# Patient Record
Sex: MALE | Race: WHITE
[De-identification: names, ages, dates, MRNs, and addresses within clinical notes are randomized per-mention and may not be internally consistent; named-entity substitution may affect disease eponyms.]

---

## 2017-02-03 ENCOUNTER — HOSPITAL ENCOUNTER (OUTPATIENT)
Dept: HOSPITAL 45 - C.RAD | Age: 82
Discharge: HOME | End: 2017-02-03
Attending: INTERNAL MEDICINE
Payer: COMMERCIAL

## 2017-02-03 DIAGNOSIS — J44.9: Primary | ICD-10-CM

## 2017-02-03 DIAGNOSIS — R05: ICD-10-CM

## 2017-02-03 NOTE — DIAGNOSTIC IMAGING REPORT
TWO VIEW CHEST



CLINICAL HISTORY: Cough. COPD.



FINDINGS: PA and lateral chest radiographs are compared to study dated 3/3/2016

and correlated with chest CT dated 11/12/2013. The heart is mildly enlarged and

there is atherosclerotic calcification of the thoracic aorta. The pulmonary

vascular structures noncongested. Mild emphysematous change is noted and there

is chronic interstitial thickening. Minimal patchy airspace opacities are

present at both lung bases. There is no pleural effusion or pneumothorax. The

skeletal structures are osteopenic. Bilateral shoulder arthroplasties are in

place. Cholecystectomy clips are noted.



IMPRESSION:



1. Mild cardiac enlargement and emphysema.



2. Patchy airspace opacities are present at the lung bases. This could represent

atelectasis and/or a mild infectious/inflammatory pneumonitis. Clinical

correlation will be required







Electronically signed by:  Marcell Juarez M.D.

2/3/2017 12:04 PM



Dictated Date/Time:  2/3/2017 11:59 AM

## 2017-03-12 ENCOUNTER — HOSPITAL ENCOUNTER (EMERGENCY)
Dept: HOSPITAL 45 - C.EDB | Age: 82
Discharge: HOME | End: 2017-03-12
Payer: COMMERCIAL

## 2017-03-12 VITALS — HEART RATE: 95 BPM | SYSTOLIC BLOOD PRESSURE: 133 MMHG | OXYGEN SATURATION: 97 % | DIASTOLIC BLOOD PRESSURE: 93 MMHG

## 2017-03-12 VITALS
HEIGHT: 65.98 IN | HEIGHT: 65.98 IN | BODY MASS INDEX: 23.07 KG/M2 | BODY MASS INDEX: 23.07 KG/M2 | WEIGHT: 143.52 LBS | WEIGHT: 143.52 LBS

## 2017-03-12 VITALS — TEMPERATURE: 97.52 F

## 2017-03-12 DIAGNOSIS — B02.23: Primary | ICD-10-CM

## 2017-03-12 DIAGNOSIS — Z79.899: ICD-10-CM

## 2017-03-12 DIAGNOSIS — J44.9: ICD-10-CM

## 2017-03-12 DIAGNOSIS — B02.9: ICD-10-CM

## 2017-03-12 DIAGNOSIS — Z79.82: ICD-10-CM

## 2017-03-12 NOTE — EMERGENCY ROOM VISIT NOTE
History


Report prepared by Nikkiibelvi:  Michelle Holly


Under the Supervision of:  Dr. Dom Cash D.O.


First contact with patient:  11:12


Chief Complaint:  ABDOMINAL PAIN


Stated Complaint:  PAIN IN UPPER TORSO


Nursing Triage Summary:  


Pt presents with LUQ abd pain x 1 week. States "always constipated". Denies 


cough, sob, n/v/d.





History of Present Illness


The patient is a 84 year old male who presents to the Emergency Room with 

complaints of progressively worsening right sided rib pain that began a week 

ago. Currently, he states that the pain is stabbing. It is worse at night to 

the point that he has difficulty falling asleep. Nothing seems to make the pain 

better or worse. The pain is present for hours at a time and woke him up from 

his sleep this morning. Initially he thought that the pain could be related to 

acid reflux but he became more concerned as his symptoms persisted. Denies cough

, fevers, shortness of breath, or other complaints. He has not noticed any rash.





   Source of History:  patient


   Onset:  a week ago


   Position:  other (left ribs)


   Quality:  stabbing


   Timing:  worsening


   Associated Symptoms:  No SOB, No cough, No fevers





Review of Systems


See HPI for pertinent positives & negatives. A total of 10 systems reviewed and 

were otherwise negative.





Past Medical & Surgical


Medical Problems:


(1) COPD (chronic obstructive pulmonary disease)


(2) Renal lesion








Family History


Noncontributory secondary to age.





Social History


Smoking Status:  Former Smoker


Marital Status:  


Housing Status:  lives with significant other


Occupation Status:  retired





Current/Historical Medications


Scheduled


Aspirin Enteric Coated (Ecotrin Or Generic *), 81 MG PO 2XWK


Bimatoprost (Lumigan), 1 DROPS OPB HS


Budesonide/Formoterol Fumarate (Symbicort 80-4.5 Mcg/Act), 2 PUFFS INH BID


Metoprolol Tartrate (Lopressor), 25 MG PO BID


Misc Natural Products (Lutein 20), 1 CAP PO QPM


Pantoprazole Sodium (Protonix), 40 MG PO QPM


Simvastatin (Zocor), 40 MG PO QPM


Tiotropium Bromide (Spiriva Handihaler), 1 CAP INH QAM





Allergies


Coded Allergies:  


     No Known Allergies (Verified , 12/4/15)





Physical Exam


Vital Signs











  Date Time  Temp Pulse Resp B/P Pulse Ox O2 Delivery O2 Flow Rate FiO2


 


3/12/17 11:08 36.4 111 18 134/88 99 Room Air  











Physical Exam


CONSTITUTIONAL/VITAL SIGNS: Reviewed / noted above.


GENERAL: Non-toxic in appearance. 


INTEGUMENTARY: Warm, dry, and Pink. He has a rash characteristic of shingles 

following the left T4 dermatome. 


HEAD: Normocephalic.


EYES: without scleral icterus or trauma.


ENT/OROPHARYNX: clear and moist.


LYMPHADENOPATHY/NECK: Is supple without lymphadenopathy or meningismus.


RESPIRATORY: Lungs clear and equal.


CARDIOVASCULAR: Regular rate and rhythm.


GI/ABDOMEN: Soft and nontender. No organomegaly or pulsatile mass. No rebound 

or guarding. Normal bowel sounds.


EXTREMITIES: Warm and well perfused.


BACK: No CVA tenderness.


NEUROLOGICAL: Intact without focal deficits. 


PSYCHIATRIC: normal affect.


MUSCULOSKELETAL: Normally developed with good muscle tone.





Medical Decision & Procedures


ED Course


1114: Previous medical records were reviewed. The patient was evaluated in room 

A2. A complete history and physical examination was performed.





1130: I discussed the results and findings with the patient. He verbalized 

agreement of the treatment plan. The patient was discharged home. Ordered 

Valtrex 1000 mg PO.





Medical Decision


the differential was considered includes acute myocardial infarction, acute 

coronary syndrome, myocarditis, pericarditis, pericardial effusions /tamponad, 

esophageal perforation, thoracic aortic dissection, pulmonary embolism, 

pneumonia, pneumothorax, pancreatitis, shingles, acute cholecystitis, 

perforated abdominal viscus.





This is an 84-year-old male who presents to the ED with a chief complaint of 

left sided upper abdominal pain.  The patient states it is been ongoing for the 

past week or so.  He states that the pain seems to be random and comes and goes 

and lasts for hours.  Denies any shortness of breath or exertional symptoms.  

No recent illness.  He states that he sometimes feels a stabbing pain in the 

left side.  His exam was normal with exception of a shingles rash following the 

left T4 dermatome.  The patient's symptoms are likely related to this.  He was 

started on Valtrex.  He was felt to be stable for discharge.





Impression





 Primary Impression:  


 Shingles (herpes zoster) polyneuropathy


 Additional Impression:  


 Shingles rash





Scribe Attestation


The scribe's documentation has been prepared under my direction and personally 

reviewed by me in its entirety. I confirm that the note above accurately 

reflects all work, treatment, procedures, and medical decision making performed 

by me.





Departure Information


Dispostion


Home / Self-Care





Prescriptions





Valacyclovir Hcl (VALTREX) 1 Gm Tab


1 TAB PO TID for 7 Days, #21 TAB


   Prov: Dom Cash D.O.         3/12/17





Referrals


Bertin Cid M.D. (PCP)





Patient Instructions


DILIP Mooney, My [facility]





Additional Instructions





Valtrax as prescribed.





Take over-the-counter pain medication for discomfort.





Follow-up with your doctor for recheck in a week.





Problem Qualifiers

## 2017-03-15 ENCOUNTER — HOSPITAL ENCOUNTER (EMERGENCY)
Dept: HOSPITAL 45 - C.EDC | Age: 82
Discharge: ANOTHER HOSPITAL | End: 2017-03-15
Payer: COMMERCIAL

## 2017-03-15 VITALS
TEMPERATURE: 98.06 F | SYSTOLIC BLOOD PRESSURE: 146 MMHG | HEART RATE: 95 BPM | DIASTOLIC BLOOD PRESSURE: 77 MMHG | OXYGEN SATURATION: 96 %

## 2017-03-15 VITALS — OXYGEN SATURATION: 98 %

## 2017-03-15 VITALS
WEIGHT: 149.25 LBS | BODY MASS INDEX: 23.99 KG/M2 | HEIGHT: 65.98 IN | BODY MASS INDEX: 23.99 KG/M2 | WEIGHT: 149.25 LBS | HEIGHT: 65.98 IN

## 2017-03-15 DIAGNOSIS — Z91.81: ICD-10-CM

## 2017-03-15 DIAGNOSIS — J44.9: ICD-10-CM

## 2017-03-15 DIAGNOSIS — R55: ICD-10-CM

## 2017-03-15 DIAGNOSIS — Z87.891: ICD-10-CM

## 2017-03-15 DIAGNOSIS — S06.5X9A: Primary | ICD-10-CM

## 2017-03-15 DIAGNOSIS — R29.700: ICD-10-CM
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
## 2017-03-15 DIAGNOSIS — M54.5: ICD-10-CM
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
## 2017-03-15 DIAGNOSIS — Z79.82: ICD-10-CM

## 2017-03-15 DIAGNOSIS — Z79.899: ICD-10-CM

## 2017-03-15 LAB
ALBUMIN/GLOB SERPL: 1.2 {RATIO} (ref 0.9–2)
ALP SERPL-CCNC: 70 U/L (ref 45–117)
ALT SERPL-CCNC: 28 U/L (ref 12–78)
ANION GAP SERPL CALC-SCNC: 7 MMOL/L (ref 3–11)
APPEARANCE UR: CLEAR
AST SERPL-CCNC: 23 U/L (ref 15–37)
BASOPHILS # BLD: 0.04 K/UL (ref 0–0.2)
BASOPHILS NFR BLD: 1.2 %
BILIRUB UR-MCNC: (no result) MG/DL
BUN SERPL-MCNC: 15 MG/DL (ref 7–18)
BUN/CREAT SERPL: 13.8 (ref 10–20)
CALCIUM SERPL-MCNC: 8.4 MG/DL (ref 8.5–10.1)
CHLORIDE SERPL-SCNC: 108 MMOL/L (ref 98–107)
CKMB/CK RATIO: 2.2 (ref 0–3)
CO2 SERPL-SCNC: 27 MMOL/L (ref 21–32)
COLOR UR: YELLOW
COMPLETE: YES
CREAT CL PREDICTED SERPL C-G-VRATE: 45.1 ML/MIN
CREAT SERPL-MCNC: 1.1 MG/DL (ref 0.6–1.4)
EOSINOPHIL NFR BLD AUTO: 184 K/UL (ref 130–400)
GLOBULIN SER-MCNC: 3.1 GM/DL (ref 2.5–4)
GLUCOSE SERPL-MCNC: 97 MG/DL (ref 70–99)
HCT VFR BLD CALC: 42.6 % (ref 42–52)
IG%: 0.3 %
IMM GRANULOCYTES NFR BLD AUTO: 24.6 %
INR PPP: 1.1 (ref 0.9–1.1)
LYMPHOCYTES # BLD: 0.82 K/UL (ref 1.2–3.4)
MANUAL MICROSCOPIC REQUIRED?: NO
MCH RBC QN AUTO: 29.6 PG (ref 25–34)
MCHC RBC AUTO-ENTMCNC: 34.3 G/DL (ref 32–36)
MCV RBC AUTO: 86.4 FL (ref 80–100)
MONOCYTES NFR BLD: 14.1 %
NEUTROPHILS # BLD AUTO: 2.1 %
NEUTROPHILS NFR BLD AUTO: 57.7 %
NITRITE UR QL STRIP: (no result)
PARTIAL THROMBOPLASTIN RATIO: 1
PFT COL EPI: 110 SECONDS (ref 80–184)
PH UR STRIP: 6 [PH] (ref 4.5–7.5)
PMV BLD AUTO: 9.7 FL (ref 7.4–10.4)
POTASSIUM SERPL-SCNC: 3.7 MMOL/L (ref 3.5–5.1)
PROTHROMBIN TIME: 11.4 SECONDS (ref 9–12)
RBC # BLD AUTO: 4.93 M/UL (ref 4.7–6.1)
REVIEW REQ?: NO
SODIUM SERPL-SCNC: 142 MMOL/L (ref 136–145)
SP GR UR STRIP: 1.01 (ref 1–1.03)
TSH SERPL-ACNC: 1.31 UIU/ML (ref 0.3–4.5)
URINE EPITHELIAL CELL AUTO: (no result) /LPF (ref 0–5)
UROBILINOGEN UR-MCNC: (no result) MG/DL
WBC # BLD AUTO: 3.33 K/UL (ref 4.8–10.8)
ZZUR CULT IF INDIC CLEAN CATCH: NO

## 2017-03-15 NOTE — DIAGNOSTIC IMAGING REPORT
SINGLE VIEW CHEST



CLINICAL HISTORY: Atypical chest pain.



FINDINGS: An AP, portable, upright chest radiograph is compared to study dated

2/3/2017 and correlated with chest CT dated 11/12/2013. The examination is

degraded by portable technique and patient rotation.  The heart is mildly

enlarged and there is atherosclerotic calcification of the thoracic aorta. The

pulmonary vasculature is noncongested. Mild emphysematous change is noted and

there is chronic interstitial thickening. There is minimal bibasilar

atelectasis. No airspace consolidation, large pleural effusion, or pneumothorax

is seen. The skeletal structures are osteopenic. Bilateral shoulder

arthroplasties are in place. 



IMPRESSION:  Mild cardiac enlargement and emphysema with no acute

cardiopulmonary abnormality.







Electronically signed by:  Marcell Juarez M.D.

3/15/2017 4:05 PM



Dictated Date/Time:  3/15/2017 4:03 PM

## 2017-03-15 NOTE — DIAGNOSTIC IMAGING REPORT
CT OF THE HEAD WITHOUT CONTRAST



CLINICAL HISTORY: Fall with head injury.    



COMPARISON STUDY:  MRI of the brain March 31, 2010. 



CT DOSE: 537.48 mGy.cm



TECHNIQUE: Helical axial images of the head were obtained without IV contrast.

Automated exposure control was utilized for the study.



FINDINGS: There is a small hyperdense left subdural collection consistent with a

hematoma that measures 7 mm in thickness. There is minimal mass effect with

sulcal effacement. There is no midline shift. Ventricular system is normal.

Basilar cisterns are patent. No additional extra-axial fluid collections are

present. There is a left posterior scalp contusion. There is no calvarial

fracture.



IMPRESSION:  



1. Small acute left subdural hematoma with minimal mass effect. A follow-up head

CT in 12 to 24 hours is recommended. Findings discussed with Dr. Boswell at

time of dictation.



2. Left posterior scalp contusion. No calvarial fracture.







Electronically signed by:  Nick Thacker M.D.

3/15/2017 5:43 PM



Dictated Date/Time:  3/15/2017 5:38 PM

## 2017-03-16 NOTE — EMERGENCY ROOM VISIT NOTE
History


Report prepared by Andrea:  Adrian Powell


Under the Supervision of:  Dr. Leandro Boswell M.D.


First contact with patient:  15:56


Chief Complaint:  SYNCOPE


Stated Complaint:  SYNCOPE, SHINGLES





History of Present Illness


The patient is a 84 year old male who presents to the Emergency Room with 

complaints of a sudden syncopal episode occurring prior to arrival. The patient 

states that he has been feeling fine for the past few days, and he ate lunch 

around 1400 today. The patient then states that he went to the other room and 

he lost consciousness and fell to the ground, and his wife found him on the 

ground. The wife states that the patient was tracking her with his eyes after 

the incident, however he was not speaking. EMS showed up 10-15 minutes later. 

The patient states that he remembers eating lunch, but he does not remember 

much after that. He states that he felt fine prior to the incident, and he was 

not feeling woozy or anything. The patient denies any history of prior falls. 

The patient states that he fell on the ice this morning, however he did not hit 

his head, and during this incident he hit his head. He states that he is on 

aspirin and recently he was put on Valtrex for his shingles. He additionally 

complains of back pain starting earlier today. Pt denies headache, fevers, 

chills, diaphoresis, visual changes, neck pain, chest pain, breathing 

difficulties, nausea, vomiting, abdominal pain, melena, hematochezia, urinary 

symptoms, numbness, weakness, lymphadenopathy, rash, or other complaints.





   Source of History:  patient, spouse/significant other


   Onset:  prior to arrival


   Position:  other (global)


   Quality:  other (syncopal episode)


   Timing:  other (sudden)


   Associated Symptoms:  + LOC, + back pain





Review of Systems


See HPI for pertinent positives and negatives.  A total of ten systems were 

reviewed and were otherwise negative.





Past Medical & Surgical


Medical Problems:


(1) COPD (chronic obstructive pulmonary disease)


(2) Renal lesion








Family History





Patient reports no known family medical history.





Social History


Smoking Status:  Former Smoker


Marital Status:  


Housing Status:  lives with significant other


Occupation Status:  retired





Current/Historical Medications


Scheduled


Aspirin Enteric Coated (Ecotrin Or Generic), 81 MG PO 3XWK


Bimatoprost (Lumigan), 1 DROPS OPB HS


Metoprolol Tartrate (Lopressor), 25 MG PO BID


Misc Natural Products (Lutein 20), 1 CAP PO QPM


Multiple Vitamins W/ Minerals (Preservision Areds), 1 CAP PO DAILY


Pantoprazole Sodium (Protonix), 40 MG PO QPM


Simvastatin (Zocor), 40 MG PO QPM


Valacyclovir Hcl (Valtrex), 1 TAB PO TID





Scheduled PRN


Ipratropium Bromide (Nasal) (Ipratropium [place]), 1 SPRAY PIERCE DAILY PRN for 

Nasal Congestion





Allergies


Coded Allergies:  


     No Known Allergies (Verified , 3/15/17)





Physical Exam


Vital Signs











  Date Time  Temp Pulse Resp B/P Pulse Ox O2 Delivery O2 Flow Rate FiO2


 


3/15/17 22:30 36.7 95 16 146/77 96   


 


3/15/17 22:12 36.7 95 16 146/77 96 Room Air  


 


3/15/17 19:49  86 18 132/97 96 Room Air  


 


3/15/17 18:36  99 12 128/85 95 Room Air  


 


3/15/17 17:23  94 12 145/79 97 Room Air  


 


3/15/17 16:35  93      


 


3/15/17 15:28 36.6 89 18 145/80 99 Room Air  


 


3/15/17 15:28     98 Room Air  











Physical Exam


GENERAL: Awake, alert, tired appearing, no distress


HEAD: Right posterior parietal hematoma.. Normocephalic. No mathias sign. No 

raccoon eyes.


EYES: Normal conjunctiva. PERRL.


EARS: External ears normal.


NOSE: Atraumatic


OROPHARYNX: Lips, tongue, and mucosa unremarkable. No erythema or exudate.


NECK: Supple with full range of motion. No tracheal deviation or JVD. No 

posterior midline tenderness. No step offs noted.


RESPIRATORY: CTA bilaterally


CARDIAC: Regular rate, normal rhythm. 


ABDOMEN: Inspection reveals no abnormalities. Soft, non distended. No 

tenderness to palpation. No hernias.


BACK: Left lower lumbar tenderness. No midline step offs. Unremarkable.


PELVIS: Stable to rock.


SKIN: T4 dermatome vesicular rash consistent with shingles


LYMPH: No adenopathy.


MUSCULOSKELETAL: Upper and lower extremities are atraumatic. 


NEURO: GCS 15. Normal sensorium. No sensory or motor deficits noted.





Medical Decision & Procedures


ER Provider


Diagnostic Interpretation:


X ray results as stated below per my interpretation and radiologist 

interpretation. Other radiology results as stated below per my review and 

radiologist interpretation:





SINGLE VIEW CHEST





CLINICAL HISTORY: Atypical chest pain.





FINDINGS: An AP, portable, upright chest radiograph is compared to study dated


2/3/2017 and correlated with chest CT dated 11/12/2013. The examination is


degraded by portable technique and patient rotation.  The heart is mildly


enlarged and there is atherosclerotic calcification of the thoracic aorta. The


pulmonary vasculature is noncongested. Mild emphysematous change is noted and


there is chronic interstitial thickening. There is minimal bibasilar


atelectasis. No airspace consolidation, large pleural effusion, or pneumothorax


is seen. The skeletal structures are osteopenic. Bilateral shoulder


arthroplasties are in place. 





IMPRESSION:  Mild cardiac enlargement and emphysema with no acute


cardiopulmonary abnormality.





Electronically signed by:  Marcell Juarez M.D.


3/15/2017 4:05 PM





Dictated Date/Time:  3/15/2017 4:03 PM





CT OF THE HEAD WITHOUT CONTRAST





CLINICAL HISTORY: Fall with head injury.    





COMPARISON STUDY:  MRI of the brain March 31, 2010. 





CT DOSE: 537.48 mGy.cm





TECHNIQUE: Helical axial images of the head were obtained without IV contrast.


Automated exposure control was utilized for the study.





FINDINGS: There is a small hyperdense left subdural collection consistent with a


hematoma that measures 7 mm in thickness. There is minimal mass effect with


sulcal effacement. There is no midline shift. Ventricular system is normal.


Basilar cisterns are patent. No additional extra-axial fluid collections are


present. There is a left posterior scalp contusion. There is no calvarial


fracture.





IMPRESSION:  





1. Small acute left subdural hematoma with minimal mass effect. A follow-up head


CT in 12 to 24 hours is recommended. Findings discussed with Dr. Boswell at


time of dictation.





2. Left posterior scalp contusion. No calvarial fracture.





Electronically signed by:  Nick Thacker M.D.


3/15/2017 5:43 PM





Dictated Date/Time:  3/15/2017 5:38 PM





CT LUMBAR SPINE WITHOUT





CT DOSE: 635.54 mGy.cm





CLINICAL HISTORY: Left lower back pain following fall.    





TECHNIQUE: Axial images of the lumbar spine were obtained without IV contrast.


Sagittal and coronal reconstructions were viewed.





COMPARISON STUDY:  None.





FINDINGS: No acute lumbar spine fracture is identified. There is moderate to


severe multilevel degenerative disc disease with disc space narrowing,


osteophytosis and vacuum disc phenomenon. A few lucent lesions are unchanged


since prior CT of November 1, 2016. These are probably benign. Paravertebral


soft tissues are unremarkable by CT. Central canal and neural foramen are


suboptimally assessed by CT. The bladder is moderately to markedly distended and


partially imaged on this exam. Bilateral renal calculi are noted.





IMPRESSION:  





1. No acute lumbar spine fracture or subluxation.





2. Moderate to severe multilevel degenerative changes of the lumbar spine.





3. Moderate to marked distention of the bladder which is partially imaged on


this exam 





Electronically signed by:  Nick Thacker M.D.


3/15/2017 5:47 PM





Dictated Date/Time:  3/15/2017 5:44 PM





Laboratory Results


3/15/17 15:55








Red Blood Count 4.93, Mean Corpuscular Volume 86.4, Mean Corpuscular Hemoglobin 

29.6, Mean Corpuscular Hemoglobin Concent 34.3, Mean Platelet Volume 9.7, 

Neutrophils (%) (Auto) 57.7, Lymphocytes (%) (Auto) 24.6, Monocytes (%) (Auto) 

14.1, Eosinophils (%) (Auto) 2.1, Basophils (%) (Auto) 1.2, Neutrophils # (Auto

) 1.92, Lymphocytes # (Auto) 0.82, Monocytes # (Auto) 0.47, Eosinophils # (Auto

) 0.07, Basophils # (Auto) 0.04





3/15/17 15:55

















Test


  3/15/17


15:55 3/15/17


15:57 3/15/17


17:29 3/15/17


20:56


 


White Blood Count


  3.33 K/uL


(4.8-10.8) 


  


  


 


 


Red Blood Count


  4.93 M/uL


(4.7-6.1) 


  


  


 


 


Hemoglobin


  14.6 g/dL


(14.0-18.0) 


  


  


 


 


Hematocrit 42.6 % (42-52)    


 


Mean Corpuscular Volume


  86.4 fL


() 


  


  


 


 


Mean Corpuscular Hemoglobin


  29.6 pg


(25-34) 


  


  


 


 


Mean Corpuscular Hemoglobin


Concent 34.3 g/dl


(32-36) 


  


  


 


 


Platelet Count


  184 K/uL


(130-400) 


  


  


 


 


Mean Platelet Volume


  9.7 fL


(7.4-10.4) 


  


  


 


 


Neutrophils (%) (Auto) 57.7 %    


 


Lymphocytes (%) (Auto) 24.6 %    


 


Monocytes (%) (Auto) 14.1 %    


 


Eosinophils (%) (Auto) 2.1 %    


 


Basophils (%) (Auto) 1.2 %    


 


Neutrophils # (Auto)


  1.92 K/uL


(1.4-6.5) 


  


  


 


 


Lymphocytes # (Auto)


  0.82 K/uL


(1.2-3.4) 


  


  


 


 


Monocytes # (Auto)


  0.47 K/uL


(0.11-0.59) 


  


  


 


 


Eosinophils # (Auto)


  0.07 K/uL


(0-0.5) 


  


  


 


 


Basophils # (Auto)


  0.04 K/uL


(0-0.2) 


  


  


 


 


RDW Standard Deviation


  41.8 fL


(36.4-46.3) 


  


  


 


 


RDW Coefficient of Variation


  13.1 %


(11.5-14.5) 


  


  


 


 


Immature Granulocyte % (Auto) 0.3 %    


 


Immature Granulocyte # (Auto)


  0.01 K/uL


(0.00-0.02) 


  


  


 


 


Prothrombin Time


  11.4 SECONDS


(9.0-12.0) 


  


  


 


 


Prothromb Time International


Ratio 1.1 (0.9-1.1) 


  


  


  


 


 


Activated Partial


Thromboplast Time 26.8 SECONDS


(21.0-31.0) 


  


  


 


 


Partial Thromboplastin Ratio 1.0    


 


Anion Gap


  7.0 mmol/L


(3-11) 


  


  


 


 


Est Creatinine Clear Calc


Drug Dose 45.1 ml/min 


  


  


  


 


 


Estimated GFR (


American) 71.1 


  


  


  


 


 


Estimated GFR (Non-


American 61.3 


  


  


  


 


 


BUN/Creatinine Ratio 13.8 (10-20)    


 


Calcium Level


  8.4 mg/dl


(8.5-10.1) 


  


  


 


 


Total Bilirubin


  0.3 mg/dl


(0.2-1) 


  


  


 


 


Aspartate Amino Transf


(AST/SGOT) 23 U/L (15-37) 


  


  


  


 


 


Alanine Aminotransferase


(ALT/SGPT) 28 U/L (12-78) 


  


  


  


 


 


Alkaline Phosphatase


  70 U/L


() 


  


  


 


 


Total Creatine Kinase


  89 U/L


() 


  


  


 


 


Creatine Kinase MB


  2.0 ng/ml


(0.5-3.6) 


  


  


 


 


Creatine Kinase MB Ratio 2.2 (0-3.0)    


 


Total Protein


  6.7 gm/dl


(6.4-8.2) 


  


  


 


 


Albumin


  3.6 gm/dl


(3.4-5.0) 


  


  


 


 


Globulin


  3.1 gm/dl


(2.5-4.0) 


  


  


 


 


Albumin/Globulin Ratio 1.2 (0.9-2)    


 


Thyroid Stimulating Hormone


(TSH) 1.310 uIu/ml


(0.300-4.500) 


  


  


 


 


Bedside Troponin I


  


  0.000 ng/ml


(0-0.045) 


  


 


 


Urine Color   YELLOW  


 


Urine Appearance   CLEAR (CLEAR)  


 


Urine pH   6.0 (4.5-7.5)  


 


Urine Specific Gravity


  


  


  1.015


(1.000-1.030) 


 


 


Urine Protein   NEG (NEG)  


 


Urine Glucose (UA)   NEG (NEG)  


 


Urine Ketones   TRACE (NEG)  


 


Urine Occult Blood   NEG (NEG)  


 


Urine Nitrite   NEG (NEG)  


 


Urine Bilirubin   NEG (NEG)  


 


Urine Urobilinogen   NEG (NEG)  


 


Urine Leukocyte Esterase   NEG (NEG)  


 


Urine WBC (Auto)   0 /hpf (0-5)  


 


Urine RBC (Auto)   0-4 /hpf (0-4)  


 


Urine Hyaline Casts (Auto)   0 /lpf (0-5)  


 


Urine Epithelial Cells (Auto)   0-5 /lpf (0-5)  


 


Urine Bacteria (Auto)   NEG (NEG)  


 


Platelet Func


Collagen/Epinephrine 


  


  


  110 SECONDS


()








Laboratory results reviewed by me





ECG


Indication:  syncope


Rate (beats per minute):  88


Rhythm:  normal sinus


Findings:  nonspecific-ST abn, Q waves (Inferior, Anterior), no acute ischemic 

change, no ectopy





ED Course


1628: The patient was evaluated in room C6. A complete history and physical 

exam was performed.





1804: I discussed the patient's case with Dr. Scott. He is going to evaluate the 

patient for further treatment.





1830: I reevaluated the patient, and he was resting. The patient is waiting to 

be transferred.





2202: After the lack of transport, I discussed the patient's case with Dr. Sellers. He is going to evaluate the patient for further treatment





Medical Decision


Triage Nursing notes reviewed.


The patient's presentation and history were concerning for syncope and closed 

head injury.  





Etiologies such as vasovagal event, infection, hypoglycemia, electrolyte 

abnormalities, cardiac sources, intracerebral event, toxicologic, neurologic, 

as well as others were entertained.  





The patient was evaluated.  He was neurologically intact.  He was awake and 

alert.  GCS was 15.  The patient had a hematoma.  He had some mild low back 

discomfort.  His C-spine was cleared via nexus criteria.  The patient underwent 

CT imaging of the head and lumbar spine.  L-spine did not reveal any evidence 

of fracture.  The patient unfortunately has a left-sided subdural hematoma.  

Neurosurgery is unavailable here.  I did discuss the case with , emergency medicine, trauma surgery, and neurosurgery.  The patient was 

accepted in transfer.  The patient's family consented.  Unfortunately due to 

weather and other extenuating circumstances transportation was not immediately 

available.  I did discuss the case with internal medicine, Dr. Saleh as well as 

Dr. Sellers from intensive care.  They evaluated the patient in the Emergency 

Room.  After some time the patient had a consultation placed by them with 

[facility].  They did accept the patient there.  [facility] 

ambulance service was willing to transfer the patient there.  The patient was 

transferred for further care.








The chart was completed utilizing Dragon Speech voice recognition software.   

Grammatical errors, random word insertions, pronoun errors, and incomplete 

sentences are an occasional consequence of this system due to software 

limitations, ambient noise, and hardware issues.  Any formal questions or 

concerns about the content, text, or information contained within the body of 

this dictation should be directly addressed to the physician for clarification.





Consults


Time Called:  1747


Consulting Physician:  Dr. Scott Ewing


Returned Call:  1804


I discussed the patient's case with Dr. Scott. He is going to evaluate the 

patient for further treatment.


Additional Consults:  


   Time Called:  2158


   Consulted Physician:  Dr. Sellers


   Returned Call:  2202


Additional Comments:


After the lack of transport, I discussed the patient's case with Dr. Sellers. 

He is going to evaluate the patient for further treatment





Impression





 Primary Impression:  


 Subdural hematoma


 Additional Impressions:  


 Syncope


 Back pain





Scribe Attestation


The scribe's documentation has been prepared under my direction and personally 

reviewed by me in its entirety. I confirm that the note above accurately 

reflects all work, treatment, procedures, and medical decision making performed 

by me.





Departure Information


Dispostion


Transfer Acute Care Facility





Referrals


Bertin Cid M.D. (PCP)





Patient Instructions


My [facility]





Problem Qualifiers

## 2017-04-19 ENCOUNTER — HOSPITAL ENCOUNTER (OUTPATIENT)
Dept: HOSPITAL 45 - C.LABSPEC | Age: 82
Discharge: HOME | End: 2017-04-19
Attending: PHYSICIAN ASSISTANT
Payer: COMMERCIAL

## 2017-04-19 DIAGNOSIS — R39.9: Primary | ICD-10-CM

## 2017-04-19 LAB
APPEARANCE UR: (no result)
BILIRUB UR-MCNC: (no result) MG/DL
COLOR UR: YELLOW
MANUAL MICROSCOPIC REQUIRED?: NO
NITRITE UR QL STRIP: (no result)
PH UR STRIP: 7 [PH] (ref 4.5–7.5)
REVIEW REQ?: NO
SP GR UR STRIP: 1.02 (ref 1–1.03)
URINE BILL WITH OR WITHOUT MIC: (no result)
URINE EPITHELIAL CELL AUTO: (no result) /LPF (ref 0–5)
UROBILINOGEN UR-MCNC: (no result) MG/DL

## 2017-04-26 ENCOUNTER — HOSPITAL ENCOUNTER (OUTPATIENT)
Dept: HOSPITAL 45 - C.LABBFT | Age: 82
Discharge: HOME | End: 2017-04-26
Attending: PHYSICIAN ASSISTANT
Payer: COMMERCIAL

## 2017-04-26 DIAGNOSIS — R39.9: Primary | ICD-10-CM

## 2017-04-26 DIAGNOSIS — R79.9: ICD-10-CM

## 2017-04-26 DIAGNOSIS — R53.83: ICD-10-CM

## 2017-04-26 LAB
ANION GAP SERPL CALC-SCNC: 8 MMOL/L (ref 3–11)
BUN SERPL-MCNC: 23 MG/DL (ref 7–18)
BUN/CREAT SERPL: 23.7 (ref 10–20)
CALCIUM SERPL-MCNC: 9.4 MG/DL (ref 8.5–10.1)
CHLORIDE SERPL-SCNC: 105 MMOL/L (ref 98–107)
CO2 SERPL-SCNC: 30 MMOL/L (ref 21–32)
CREAT SERPL-MCNC: 0.98 MG/DL (ref 0.6–1.4)
GLUCOSE SERPL-MCNC: 88 MG/DL (ref 70–99)
MAGNESIUM SERPL-MCNC: 2.5 MG/DL (ref 1.8–2.4)
POTASSIUM SERPL-SCNC: 3.9 MMOL/L (ref 3.5–5.1)
SODIUM SERPL-SCNC: 143 MMOL/L (ref 136–145)

## 2017-05-03 NOTE — CODING QUERY MEDICAL NECESSITY
CQSUPPORTING DIAGNOSIS NEEDED





A supporting diagnosis is required for the test/procedure performed on this patient in 
order for us to be reimbursed by the patient's insurance. Please provide a supporting 
diagnosis for the following test/procedure listed below next to the test name along with 
your signature. 



*If there is no additional diagnosis for this patient that would support the following 
test/procedure please document that below next to the test/procedure.



Test(s)/Procedure(s) that require a supporting diagnosis:



DOS    04/26/17     VITAMIN B12 TEST     TEST ORDERED BY FAHAD MELCHOR







Provider Signature:  ______________________________  Date:  _______



Thank you  

Jennifer Mccoy

Health Information Management

Phone:  [phone]

Fax:  [phone]



Once completed, please kindly fax back to [phone]



For questions please call [phone]

## 2017-09-26 ENCOUNTER — HOSPITAL ENCOUNTER (OUTPATIENT)
Dept: HOSPITAL 45 - C.LABBFT | Age: 82
Discharge: HOME | End: 2017-09-26
Attending: PHYSICIAN ASSISTANT
Payer: COMMERCIAL

## 2017-09-26 DIAGNOSIS — R79.9: Primary | ICD-10-CM

## 2017-09-26 DIAGNOSIS — E78.5: ICD-10-CM

## 2017-09-26 LAB
CHOLEST/HDLC SERPL: 2.3 {RATIO}
GLUCOSE UR QL: 43 MG/DL
KETONES UR QL STRIP: 43 MG/DL
MAGNESIUM SERPL-MCNC: 2.5 MG/DL (ref 1.8–2.4)
NITRITE UR QL STRIP: 75 MG/DL (ref 0–150)
PH UR: 101 MG/DL (ref 0–200)
VERY LOW DENSITY LIPOPROT CALC: 15 MG/DL

## 2017-10-02 ENCOUNTER — HOSPITAL ENCOUNTER (OUTPATIENT)
Dept: HOSPITAL 45 - C.LABBFT | Age: 82
Discharge: HOME | End: 2017-10-02
Attending: PHYSICIAN ASSISTANT
Payer: COMMERCIAL

## 2017-10-02 DIAGNOSIS — R73.01: ICD-10-CM

## 2017-10-02 DIAGNOSIS — E04.1: ICD-10-CM

## 2017-10-02 DIAGNOSIS — E83.41: Primary | ICD-10-CM

## 2017-10-02 LAB
ALBUMIN/GLOB SERPL: 1.1 {RATIO} (ref 0.9–2)
ALP SERPL-CCNC: 68 U/L (ref 45–117)
ALT SERPL-CCNC: 19 U/L (ref 12–78)
ANION GAP SERPL CALC-SCNC: 10 MMOL/L (ref 3–11)
AST SERPL-CCNC: 18 U/L (ref 15–37)
BASOPHILS # BLD: 0.04 K/UL (ref 0–0.2)
BASOPHILS NFR BLD: 0.6 %
BUN SERPL-MCNC: 14 MG/DL (ref 7–18)
BUN/CREAT SERPL: 13.1 (ref 10–20)
CALCIUM SERPL-MCNC: 9.5 MG/DL (ref 8.5–10.1)
CHLORIDE SERPL-SCNC: 105 MMOL/L (ref 98–107)
CO2 SERPL-SCNC: 26 MMOL/L (ref 21–32)
COMPLETE: YES
CREAT SERPL-MCNC: 1.1 MG/DL (ref 0.6–1.4)
EOSINOPHIL NFR BLD AUTO: 266 K/UL (ref 130–400)
EST. AVERAGE GLUCOSE BLD GHB EST-MCNC: 114 MG/DL
GLOBULIN SER-MCNC: 3.5 GM/DL (ref 2.5–4)
GLUCOSE SERPL-MCNC: 132 MG/DL (ref 70–99)
HA1C FLAG: 25
HCT VFR BLD CALC: 46.5 % (ref 42–52)
IG%: 0.2 %
IMM GRANULOCYTES NFR BLD AUTO: 19.2 %
LYMPHOCYTES # BLD: 1.28 K/UL (ref 1.2–3.4)
MAGNESIUM SERPL-MCNC: 2.3 MG/DL (ref 1.8–2.4)
MCH RBC QN AUTO: 30.6 PG (ref 25–34)
MCHC RBC AUTO-ENTMCNC: 34.2 G/DL (ref 32–36)
MCV RBC AUTO: 89.4 FL (ref 80–100)
MONOCYTES NFR BLD: 7.8 %
NEUTROPHILS # BLD AUTO: 1.2 %
NEUTROPHILS NFR BLD AUTO: 71 %
PMV BLD AUTO: 10.5 FL (ref 7.4–10.4)
POTASSIUM SERPL-SCNC: 4.2 MMOL/L (ref 3.5–5.1)
RBC # BLD AUTO: 5.2 M/UL (ref 4.7–6.1)
SODIUM SERPL-SCNC: 141 MMOL/L (ref 136–145)
TSH SERPL-ACNC: 1.25 UIU/ML (ref 0.3–4.5)
WBC # BLD AUTO: 6.65 K/UL (ref 4.8–10.8)

## 2018-01-29 ENCOUNTER — HOSPITAL ENCOUNTER (OUTPATIENT)
Dept: HOSPITAL 45 - C.LABBFT | Age: 83
Discharge: HOME | End: 2018-01-29
Attending: PHYSICIAN ASSISTANT
Payer: COMMERCIAL

## 2018-01-29 ENCOUNTER — HOSPITAL ENCOUNTER (OUTPATIENT)
Dept: HOSPITAL 45 - C.RAD1850 | Age: 83
Discharge: HOME | End: 2018-01-29
Attending: PHYSICIAN ASSISTANT
Payer: COMMERCIAL

## 2018-01-29 DIAGNOSIS — J44.9: Primary | ICD-10-CM

## 2018-01-29 DIAGNOSIS — R53.83: Primary | ICD-10-CM

## 2018-01-29 LAB
ALBUMIN SERPL-MCNC: 3.5 GM/DL (ref 3.4–5)
ALP SERPL-CCNC: 63 U/L (ref 45–117)
ALT SERPL-CCNC: 30 U/L (ref 12–78)
AST SERPL-CCNC: 16 U/L (ref 15–37)
BASOPHILS # BLD: 0.03 K/UL (ref 0–0.2)
BASOPHILS NFR BLD: 0.3 %
BUN SERPL-MCNC: 24 MG/DL (ref 7–18)
CALCIUM SERPL-MCNC: 9.1 MG/DL (ref 8.5–10.1)
CO2 SERPL-SCNC: 28 MMOL/L (ref 21–32)
CREAT SERPL-MCNC: 1.22 MG/DL (ref 0.6–1.4)
EOS ABS #: 0.07 K/UL (ref 0–0.5)
EOSINOPHIL NFR BLD AUTO: 319 K/UL (ref 130–400)
GLUCOSE SERPL-MCNC: 99 MG/DL (ref 70–99)
HCT VFR BLD CALC: 50.8 % (ref 42–52)
HGB BLD-MCNC: 17.3 G/DL (ref 14–18)
IG#: 0.08 K/UL (ref 0–0.02)
IMM GRANULOCYTES NFR BLD AUTO: 16.7 %
LYMPHOCYTES # BLD: 1.52 K/UL (ref 1.2–3.4)
MCH RBC QN AUTO: 30.2 PG (ref 25–34)
MCHC RBC AUTO-ENTMCNC: 34.1 G/DL (ref 32–36)
MCV RBC AUTO: 88.7 FL (ref 80–100)
MONO ABS #: 0.91 K/UL (ref 0.11–0.59)
MONOCYTES NFR BLD: 10 %
NEUT ABS #: 6.47 K/UL (ref 1.4–6.5)
NEUTROPHILS # BLD AUTO: 0.8 %
NEUTROPHILS NFR BLD AUTO: 71.3 %
PMV BLD AUTO: 10.6 FL (ref 7.4–10.4)
POTASSIUM SERPL-SCNC: 3.7 MMOL/L (ref 3.5–5.1)
PROT SERPL-MCNC: 7.4 GM/DL (ref 6.4–8.2)
RED CELL DISTRIBUTION WIDTH CV: 12.9 % (ref 11.5–14.5)
RED CELL DISTRIBUTION WIDTH SD: 41.6 FL (ref 36.4–46.3)
SODIUM SERPL-SCNC: 136 MMOL/L (ref 136–145)
WBC # BLD AUTO: 9.08 K/UL (ref 4.8–10.8)

## 2018-01-29 NOTE — DIAGNOSTIC IMAGING REPORT
CHEST 2 VIEWS ROUTINE



CLINICAL HISTORY: 85 years-old Male presenting with J44.9 Chronic obstructive

pulmonary [id number]. 



TECHNIQUE: PA and lateral views of the chest were obtained.



COMPARISON: 3/15/2017.



FINDINGS:

Atherosclerosis of the aortic arch. Cardiac silhouette normal in size.

Heterogeneous radiolucency of the lungs. Lungs and pleural spaces otherwise

clear. Bilateral shoulder arthroplasties. Old left rib fractures noted.

Cholecystectomy clips noted.



IMPRESSION:

1.  No acute cardiopulmonary disease.







Electronically signed by:  Kwabena Stevenson M.D.

1/29/2018 11:00 AM



Dictated Date/Time:  1/29/2018 10:58 AM

## 2018-01-30 LAB — EBV EA IGG SER-ACNC: 27.6 U/ML

## 2018-04-17 ENCOUNTER — HOSPITAL ENCOUNTER (OUTPATIENT)
Dept: HOSPITAL 45 - C.NEUR | Age: 83
Discharge: HOME | End: 2018-04-17
Attending: PHYSICIAN ASSISTANT
Payer: COMMERCIAL

## 2018-04-17 VITALS — HEART RATE: 71 BPM | SYSTOLIC BLOOD PRESSURE: 108 MMHG | DIASTOLIC BLOOD PRESSURE: 55 MMHG

## 2018-04-17 VITALS
HEIGHT: 67.01 IN | BODY MASS INDEX: 22.87 KG/M2 | WEIGHT: 145.73 LBS | BODY MASS INDEX: 22.87 KG/M2 | HEIGHT: 67.01 IN | WEIGHT: 145.73 LBS

## 2018-04-17 DIAGNOSIS — R06.83: ICD-10-CM

## 2018-04-17 DIAGNOSIS — G47.33: Primary | ICD-10-CM

## 2018-05-03 ENCOUNTER — HOSPITAL ENCOUNTER (OUTPATIENT)
Dept: HOSPITAL 45 - C.NEUR | Age: 83
Discharge: HOME | End: 2018-05-03
Attending: INTERNAL MEDICINE
Payer: COMMERCIAL

## 2018-05-03 DIAGNOSIS — G47.33: Primary | ICD-10-CM

## 2018-05-04 NOTE — PAP/PSG TECHNICIAN REPORT
[facility]

Technician Polysomnogram Report



Study name:

None

Report date:

5/4/2018



Study date:

5/3/2018

Referring Physician:

JESUS JORGENSEN M.D.



Name:

MULU INFANTE

Interpreting Physician:

Leandro Reyes M.D.



YOB: 1932

Technician:

Serina Lizarraga, PSGT.



Sex:

Male







Age:

85

StudyType:

PSG



Weight:

145 lbs









Height:

85 years, Height 5' 6"

Neck Circum:15 inches







BMI:

23.4







Medications:

Aspirin, Lumigan, Lutein, Paroxetine , Presser Vision, Simvastatin, Ventolin.















Patient History





85-year-old male presents with many medical issues including headaches, COPD, cardiomyopathy, 
depression /anxiety, etc. Had a home SS done in 1/2018 and had an AHI of 26.5. A titration sleep 
study will be done At his visit. ESS= 11, NECK= 15 INCHES







Parameters Monitored

NPSG: E1-M2, E2-M1, Fp1-M2, Fp2-M1, F3-M2, F4-M2, F4-M1, C3-M2, C4-M2, C4-M1, O1-M2, O2-M2,

O2-M1, T3-M2, T4-M1, P3-M2, P4-M1, CHIN1, CHIN2, HR, EKG, Legs, PFLOW, SNOR, FLOW, CFLOW,

Tidal Volume, THOR, ABDO, SpO2, PLTH, CPRESS, ETCO2 Wave, ETCO2, pH





Sleep Architecture



Sleep Stages



Time at Lights Off

10:58:15 PM



STAGES

Time (min.)

TST (%)



Time at Lights On

5:20:15 AM



Wake

172.5

--



Total Recording Time (TRT)

382.50 min.



N1

7.0

3



Total Sleep Period (TSP)

369.0 min.



N2

202.0

97



Total Sleep Time (TST)

209.0min.



N3

0.0

0



Awake Time

173.5 min.



REM

0.0

0



Wake after Sleep Onset

161.5 min.











Sleep Efficiency (SE)

55 %











Sleep Onset Latency (SOL)

11.5 min.











Number of Stage 1 Shifts

None











Awakenings

17











Stage Changes

49











Number of REM periods

N/A



REM

0.0

0



REM Latency

NONE min.



NREM

209.0

100





Body Position Analysis





Supine

Right

Left

Side

Prone

Vertical



Total Sleep Time (min.)

381.5

0.0

0.0

0.00

0.0

0.0



Total Sleep Time (%)

100%

0%

0%

0

0%

N/A%



Total Sleep Time REM (min.)

0.0

None

0.0

0.0



Total Sleep Time NREM (min.)

209.0

0.0

0.0

None

0.0

0.0



Intermittent Wake (min.)

172.5

0.0

0.0

None

0.0

0.0



Total Sleep Period (%)

100%

None





Arousals







Myoclonus (PLM) *







Events

Count

Index



Events

Count

Index



Spontaneous

60

17



Events Awake (PLMW)

3

1.0



Respiratory

7

2.0



Events Asleep w/ Arousal (PLMA)

19

5.5



PLM

19

5



Events Asleep w/o Arousal (PLMS)

166

47.7



Snoring

6

2



Total Asleep

185

53.1



Total

92

26



Total

188

30







Respiratory Analysis *





CA

OA

MA

CH

H

RERA

Total



Count

0

1

0

0

25

0

26



Index

0.0

0.3

0.0

0

7.2

0

7.5



Mean Duration

0.0

27.8

0.0

0.00

25.5

0.0

25.6



Longest Duration

0.0

27.8

0.0

0.00

0.0

0.0

53.4







Respiratory Event Summary 







Total

Supine

~Supine

Right

Left

Prone

REM

NREM



Apneas

Count

1

1

N/A

1





Index

0.3

0

N/A

0



Hypopneas (4% Desat)

Count

25

25

N/A

25





Index

7.2

7.2

N/A

7.2



Apneas & All Hypopneas 

Count

26

26

N/A

26





Index

7.5

7

N/A

7.5



Respiratory Events 

(Apn+All Hyp+RERA)

Count

26

26

N/A

26





Index

7.5

7

N/A

7.5



Respiratory Related Arousal

Count

7

26

N/A

7





Index

2.0

2

N/A

2





Snoring Analysis





Supine

Right

Left

Prone

REM

NREM

Total



Snore duration

3.4 min



Snores count

126

N/A

126

126



Snore mean duration

1.6 Sec



Snores index

36

N/A

36.2

36.2



TST with snoring (%)

1.6%







Desaturation Event Summary:



Minimum %SpO2

Event Count

Mean/Min/Max Duration(sec.)

Desaturation Index

% Time In Bed



> 90

24

38.1 / 10.5 / 60.0

4.1

92.7



86 - 90

1

10.5 / 10.5 / 10.5

2.2

7.3



81 - 85

0

N/A

0.0

0.0



76 - 80

0

N/A

0.0

0.0



71 - 75

0

N/A

0.0

0.0



66 - 70

0

N/A

0.0

0.0



61 - 65

0

N/A

0.0

0.0



56 - 60

0

N/A

0.0

0.0



51 - 55

0

N/A

0.0

0.0



< 50

0

N/A

0.0

0.0









Total

REM

NREM

Awake



<50%

0.0 min.



51 - 60%

0.0 min.



61 - 70%

0.0 min.



71 - 80%

0.0 min.



81 - 90%

27.9 min.

0.0 min.

20.3 min.

7.6 min.



91 - 100%

352.7 min.

0.0 min.

188.5 min.

164.2 min.



Average

93

0

92

93



Minimum SpO2

86

N/A

88

86



Desaturation Event Index

3.8

0.0

6.9

0.0



# Desat. Events below 89%

5

N/A

5

0



Time(%) with Saturation below 89%

0.6

0.0

0.4

0.2



Time(min.) with Saturation below 89%

2.3

0.0

1.4

0.9







Heart Rate Analysis



End Tidal CO2 Analysis





Min (bpm)

Max (bpm)

Average (bpm)





TSP (mins)

% of TSP



Awake

52

64

56



Above 55 mmHg

0.0

0.0



NREM

51

62

56



50-55 mmHg

0.0

0.0



REM

N/A



45-50 mmHg

209.0

100.0



Overall

51

62

56



40-45 mmHg

0.0

0.0













35-40 mmHg

0.0

0.0













30-35 mmHg

0.0

0.0































Average ETCO2

0.0





Supplemental O2 Values



Minimum O2 level: None



Value  

Start Time

End Time





Technician Comments





PAP Study:

Mr. Infante slept in the supine position, the entire study.  No cardiac arrhythmia or PLM's noted.  
No bruxism noted.  CPAP was initiated at +4 CMH2O and up-titrated to an optimal level of +9 CMH2O, 
which nearly eliminated all respiratory events and snoring.  A medium Res Med Mirage Quattro, was 
used during titration   Mr. Infante awoke to use the restroom zero times during the night.  Mr. Infante stated, I did not sleep as well as I do when I am in my own bed. 

The final report will be interpreted and signed by a sleep physician. The completed physician report 
will then be placed in the patient medical record.





Mr Infante woke often pulling at the mask, trying to rub his face etc. He seemed to struggle with 
the mask most of the study. 







 



 



 



 



 



 

 



 



 



Therapy Event:



Therapy (cm H20)

0

4

5

6

7

8

9



Total Time at Pressure (min.)

0.7

103.2

35.7

51.1

120.8

37.9

32.1



TST at Pressure (min.)

0.0

59.9

20.2

25.6

41.4

33.4

28.6



# Periods

1



Sleep Onset (min.)

N/A

10.8

0.0

1.0

0.0



REM Onset (min.)

N/A



Sleep Efficiency %

0

58

56

50

34

88

89



Wakefulness (%)

100.0

42.0

43.4

49.9

65.8

12.0

10.9



Wakefulness (min.)

0.7

43.3

15.5

25.5

79.5

4.5

3.5



NREM 1 (%)

0.0

2.9

2.8

0.0

1.2

4.0

0.0



NREM 1 (min.)

0.0

3.0

1.0

0.0

1.5

1.5

0.0



NREM 2 (%)

0.0

55.1

53.8

50.1

33.0

84.1

89.1



NREM 2 (min.)

0.0

56.9

19.2

25.6

39.9

31.9

28.6



NREM 3 (%)

0.0



NREM 3 (min.)

0.0



REM (%)

0.0



REM (min.)

0.0



# Arousals

N/A

38

9

12

14

14

5



Arousal Index

N/A

38.1

26.8

28.2

20.3

25.2

10.5



# Snore

N/A

26

11

13

25

9

42



Snore Index

N/A

26.1

32.7

30.5

36.3

16.2

88.1



AHI

N/A

5.0

11.9

7.0

7.3

10.8

6.3



AHI Supine

N/A

5.0

11.9

7.0

7.3

10.8

6.3



AHI Non-Supine

N/A



NREM AHI

N/A

5.0

11.9

7.0

7.3

10.8

6.3



REM AHI

N/A



RDI

N/A

5.0

11.9

7.0

7.3

10.8

6.3



# Obstructive

N/A

0

1

0

0



# Central Ap

N/A

0



# Mixed

N/A

0



# Hypopneas

N/A

5

4

3

4

6

3



RERAS

N/A

0



Total Respiratory Events

N/A

5

4

3

5

6

3



Time Below SpO2 89.00% (min.)

0.0

0.9

0.0

0.0

0.5

0.0

0.0



Mean NREM SpO2 (%)

N/A

92

92

93

92

93

93



Mean REM SpO2 (%)

N/A



Mean Sleep SpO2 (%)

N/A

92

92

93

92

93

93



Min NREM SpO2 (%)

N/A

88

89

89

88

89

89



Min REM SpO2 (%)

N/A



Position Supine (min.)

0.0

59.9

20.2

25.6

41.4

33.4

28.6



Position Non-supine (min.)

0.0



LM Index Sleep

N/A

95.2

95.1

51.6

20.3

25.2

16.8



LM Index NREM

N/A

95.2

95.1

51.6

20.3

25.2

16.8



LM Index REM

N/A



Mean Heart Rate (bpm)

N/A

56

55

54



Min Heart Rate (bpm)

N/A

53

54

53

52

51

52

## 2018-05-08 NOTE — POLYSOMNOGRAPH REPORT
CLINICAL DATA:  An 85-year-old male with BMI of 23.4 referred by Dr. Cid

and Ирина Causey for CPAP study.  He has multiple medical problems.  He had a

sleep study done at home which showed moderate JACOBY with an BEE of 26.5.  His

[place] sleepiness score is 11/24.

 

SLEEP ARCHITECTURE:  Total sleep period was 369 minutes.  Total sleep time

was 209 minutes all non-REM sleep.  Sleep latency was 11.5 minutes.  Sleep

efficiency was reduced to 55%.  Wake after sleep onset was 161.5 minutes. 

Sleep consisted of stage N1 3%, and stage N2 97%.

 

AROUSAL DATA:  92 arousals were recorded for an index of 26 per hour; 62 were

spontaneous.

 

PLM DATA:  Markedly elevated limb movements during sleep were noted.  There

are 185 limb movements during sleep noted for an index of 52.1 per hour with an

arousal index of 5.5 per hour.

 

RESPIRATORY DATA:  The AHI was 7.5.  There was one obstructive apneic episode,

27.8 seconds in duration.  There were 25 hypopneic episodes with a mean

duration of 25.5 seconds.

 

OXIMETRY DATA:  No hypoxemia was seen.  Oxygen silvia was 88%.  Mean

saturation was 93%.

 

EKG:  Heart rates ranged from 51-62 beats per minute.  No arrhythmias were

noted.

 

TECHNICIAN'S COMMENTS AND TREATMENT SUMMARY:  The patient slept supine.  A

ResMed Mirage Quattro mask, medium size was used.  CPAP was titrated up to 9

cm water pressure.  The patient often awoke pulling off his mask, trying to

rub his face.  He struggled with it for most of the study.  At this final

pressure setting, he slept for 28 minutes with an AHI of 6.

 

IMPRESSION:  Moderate sleep apnea/hypopnea improved with CPAP 9 cm water

pressure.  However, the patient did not reach REM sleep.

 

RECOMMENDATIONS:  The patient could be started on the above noted treatment

regimen and seen back in followup in 90 days to document efficacy and

compliance.  He may need Lunesta 2 mg for 1-2 weeks to help him to adapt to

CPAP.  Clinical correlation is needed.

 

 

DBD

## 2023-09-21 NOTE — DIAGNOSTIC IMAGING REPORT
CT LUMBAR SPINE WITHOUT



CT DOSE: 635.54 mGy.cm



CLINICAL HISTORY: Left lower back pain following fall.    



TECHNIQUE: Axial images of the lumbar spine were obtained without IV contrast.

Sagittal and coronal reconstructions were viewed.



COMPARISON STUDY:  None.



FINDINGS: No acute lumbar spine fracture is identified. There is moderate to

severe multilevel degenerative disc disease with disc space narrowing,

osteophytosis and vacuum disc phenomenon. A few lucent lesions are unchanged

since prior CT of November 1, 2016. These are probably benign. Paravertebral

soft tissues are unremarkable by CT. Central canal and neural foramen are

suboptimally assessed by CT. The bladder is moderately to markedly distended and

partially imaged on this exam. Bilateral renal calculi are noted.



IMPRESSION:  



1. No acute lumbar spine fracture or subluxation.



2. Moderate to severe multilevel degenerative changes of the lumbar spine.



3. Moderate to marked distention of the bladder which is partially imaged on

this exam 







Electronically signed by:  Nick Thacker M.D.

3/15/2017 5:47 PM



Dictated Date/Time:  3/15/2017 5:44 PM [facility] Pregnancy And Lactation Text: This medication is contraindicated in pregnancy and when breast feeding.